# Patient Record
Sex: FEMALE | Race: WHITE
[De-identification: names, ages, dates, MRNs, and addresses within clinical notes are randomized per-mention and may not be internally consistent; named-entity substitution may affect disease eponyms.]

---

## 2018-11-12 ENCOUNTER — HOSPITAL ENCOUNTER (INPATIENT)
Dept: HOSPITAL 26 - MED | Age: 38
Discharge: LEFT BEFORE DISCHARGE | DRG: 284 | End: 2018-11-12
Attending: GENERAL PRACTICE | Admitting: GENERAL PRACTICE
Payer: MEDICAID

## 2018-11-12 VITALS — SYSTOLIC BLOOD PRESSURE: 107 MMHG | DIASTOLIC BLOOD PRESSURE: 68 MMHG

## 2018-11-12 VITALS — BODY MASS INDEX: 42.68 KG/M2 | HEIGHT: 64 IN | WEIGHT: 250 LBS

## 2018-11-12 VITALS — SYSTOLIC BLOOD PRESSURE: 131 MMHG | DIASTOLIC BLOOD PRESSURE: 78 MMHG

## 2018-11-12 VITALS — SYSTOLIC BLOOD PRESSURE: 104 MMHG | DIASTOLIC BLOOD PRESSURE: 66 MMHG

## 2018-11-12 DIAGNOSIS — K76.89: ICD-10-CM

## 2018-11-12 DIAGNOSIS — E66.01: ICD-10-CM

## 2018-11-12 DIAGNOSIS — E78.5: ICD-10-CM

## 2018-11-12 DIAGNOSIS — Z88.8: ICD-10-CM

## 2018-11-12 DIAGNOSIS — Z91.14: ICD-10-CM

## 2018-11-12 DIAGNOSIS — Z98.51: ICD-10-CM

## 2018-11-12 DIAGNOSIS — Z53.21: ICD-10-CM

## 2018-11-12 DIAGNOSIS — Z90.49: ICD-10-CM

## 2018-11-12 DIAGNOSIS — Z91.041: ICD-10-CM

## 2018-11-12 DIAGNOSIS — K83.8: Primary | ICD-10-CM

## 2018-11-12 LAB
ALBUMIN FLD-MCNC: 3.6 G/DL (ref 3.4–5)
AMYLASE SERPL-CCNC: 70 U/L (ref 25–115)
ANION GAP SERPL CALCULATED.3IONS-SCNC: 13.5 MMOL/L (ref 8–16)
AST SERPL-CCNC: 24 U/L (ref 15–37)
BASOPHILS # BLD AUTO: 0.1 K/UL (ref 0–0.22)
BASOPHILS NFR BLD AUTO: 1.2 % (ref 0–2)
BILIRUB SERPL-MCNC: 0.4 MG/DL (ref 0–1)
BUN SERPL-MCNC: 12 MG/DL (ref 7–18)
CHLORIDE SERPL-SCNC: 103 MMOL/L (ref 98–107)
CHOLEST/HDLC SERPL: 2.8 {RATIO} (ref 1–4.5)
CO2 SERPL-SCNC: 27.6 MMOL/L (ref 21–32)
CREAT SERPL-MCNC: 0.8 MG/DL (ref 0.6–1.3)
EOSINOPHIL # BLD AUTO: 0.1 K/UL (ref 0–0.4)
EOSINOPHIL NFR BLD AUTO: 1.3 % (ref 0–4)
ERYTHROCYTE [DISTWIDTH] IN BLOOD BY AUTOMATED COUNT: 14.5 % (ref 11.6–13.7)
GFR SERPL CREATININE-BSD FRML MDRD: 105 ML/MIN (ref 90–?)
GLUCOSE SERPL-MCNC: 101 MG/DL (ref 74–106)
HCT VFR BLD AUTO: 42.1 % (ref 36–48)
HDLC SERPL-MCNC: 72 MG/DL (ref 40–60)
HGB BLD-MCNC: 13.5 G/DL (ref 12–16)
LDLC SERPL CALC-MCNC: 114 MG/DL (ref 60–100)
LIPASE SERPL-CCNC: 378 U/L (ref 73–393)
LYMPHOCYTES # BLD AUTO: 2.4 K/UL (ref 2.5–16.5)
LYMPHOCYTES NFR BLD AUTO: 21.6 % (ref 20.5–51.1)
MAGNESIUM SERPL-MCNC: 1.9 MG/DL (ref 1.8–2.4)
MCH RBC QN AUTO: 28 PG (ref 27–31)
MCHC RBC AUTO-ENTMCNC: 32 G/DL (ref 33–37)
MCV RBC AUTO: 86.5 FL (ref 80–94)
MONOCYTES # BLD AUTO: 0.4 K/UL (ref 0.8–1)
MONOCYTES NFR BLD AUTO: 3.8 % (ref 1.7–9.3)
NEUTROPHILS # BLD AUTO: 7.8 K/UL (ref 1.8–7.7)
NEUTROPHILS NFR BLD AUTO: 72.1 % (ref 42.2–75.2)
PHOSPHATE SERPL-MCNC: 3.6 MG/DL (ref 2.5–4.9)
PLATELET # BLD AUTO: 371 K/UL (ref 140–450)
POTASSIUM SERPL-SCNC: 4.1 MMOL/L (ref 3.5–5.1)
PROTHROMBIN TIME: 9.4 SECS (ref 10.8–13.4)
RBC # BLD AUTO: 4.87 MIL/UL (ref 4.2–5.4)
SODIUM SERPL-SCNC: 140 MMOL/L (ref 136–145)
T4 FREE SERPL-MCNC: 0.82 NG/DL (ref 0.76–1.46)
TRIGL SERPL-MCNC: 67 MG/DL (ref 30–150)
TSH SERPL DL<=0.05 MIU/L-ACNC: 1.62 UIU/ML (ref 0.34–3.74)
WBC # BLD AUTO: 10.9 K/UL (ref 4.8–10.8)

## 2018-11-12 NOTE — NUR
AGO.PT C/O R ABD PAIN X1 WEEK, 7/10 SHARP PAIN. REPORTS N/V X 3 EPISODES TODAY. 
BM NORMAL THIS AM.MED HX: GALL BLADDER REMOVAL 10 YEARS . ALLERGIES---TORADOL, 
IV CONTRAST. SKIN IS PINK/WARM/DRY; AAOX4 WITH EVEN AND STEADY GAIT; PATIENT 
STATES PAIN OF 7/10 AT THIS TIME; PATIENT POSITIONED FOR COMFORT; HOB ELEVATED; 
BEDRAILS UP X2; BED DOWN. ER MD MADE AWARE OF PT STATUS.

## 2018-11-12 NOTE — NUR
SPOKE WITH DR. ROGERS REGARDING BENADRYL ORDER, DR. ROGERS D/C'D THE BENADRYL PO ORDER AND 
ORDERED 12.5MG OF BENADRYL IVP.

## 2018-11-12 NOTE — NUR
DR. ROGERS WAS IN PT ROOM EXPLAING THAT SHE ORDERED THE TRANSVAGINAL US TO RULE OUT PAIN IN 
LOWER ABD CAUSED BY A TWISTED OVARY. PT THEN AGREED TO TAKE THE TEST. PT STILL RESTLESS AND 
AFTER DR. ROGERS LEFT, PT WANTED TO KNOW WHAT SHE WOULD BE GIVEN IF PAIN RETURNED AGAIN AND 
PT WAS INFORMED THAT DR. ROGERS ORDERED NORCO 7.5/325MG AND PT WAS UPSET BECAUSE SHE SAID 
THAT SHE TAKES 10/325MG NORCO AT HOME AND SHE FELT THAT HER PAIN WOULD BE BETTER CONTROLLED 
AT HOME. PT SAID THAT SHE WAS GOING WITH HER MOTHER TO [facility] TOMMORROW. PT DIDNT WANT TO 
SPEAK WITH  BUT PRIMARY NURSE SAID THAT IF SHE WANTS TO LEAVE [place] THAT THERE IS A FORM TO 
SIGN AND THAT THE DOCTOR WOULD NEED TO SPEAK WITH YOU ONE LAST TIME., PT VERBALIZED 
UNDERSTANDING AND ASKED FOR THE IV FLUIDS TO BE STOPPED SO SHE COULD GET DRESSED IN HER 
STREET CLOTHES. PRIMARY NURSE STOPPED FLUIDS AND TOOK OUT IV SITE. DR. ROGERS INFORMED.

## 2018-11-12 NOTE — NUR
PT ARRIVED ON THE UNIT WITH 1 ER NURSE, IN A WHEELCHAIR. PT IS ALERT AND ORIENTED. 
INTRODUCED MYSELF AND UPDATED THE BOARD. PT AMBULATED FROM THE WHEELCHAIR TO THE BED AND TO 
THE BATHROOM AND BACK. STEADY GAIT. C/O ABD PAIN. WILL CHECK ORDERS. IV ON R FA 24G ZOSYN 
INFUSING. SKIN INTACT. VS WITHIN NORMAL LIMITS. MRSA SCREENING DONE. WILL CONTINUE TO 
MONITOR PT.

## 2018-11-12 NOTE — NUR
DR. ROGERS ARRIVED IN PT ROOM AND EXPLAINED THE AMA FORM AND THAT THE HOSPITAL IS NOT 
LIABLE FOR ANY NEGATIVE EFFECTS IF SHE CHOOSES TO LEAVE AMA. PT SIGNED PAPER AND VERBALIZED 
UNDERSTANDING. PT ARM BAND CUT OFF AND PT  LEFT IN STREET CLOTHES.

## 2018-11-12 NOTE — NUR
PT SITTING IN LOW BED HOB UP 45%. V/S AS FOLLOWS T 98.2 P 90 R 20 B/P 141/72 02 93% WITH 
R/A. PT AOX4 BUT APPEARED ANXIOUS AND ABOUT  NOT RECEIVING MORE PAIN MEDS FOR HER ABD. PT 
INFORMED THAT REPORT WAS GIVEN FROM RELIVING NURSE AND THAT PRIMAY NURSE WILL DOUBLE CHECK 
WHAT THE DOCTOR ORDERED AND WHAT WAS ALREADY GIVEN  FOR PAIN AT THIS TIME. PT VERBALIZED 
UNDERSTANDING.

## 2018-11-12 NOTE — NUR
PT C/O PAIN, 8/10 AND IT[address]INESS. DR. ROGERS ORDERED A GI COCKTAIL FOR ABD PAIN. PT TOOK 
MEDICATION AS ADMINISTERED BUT IS STILL HAS C/O OF SEVERE PAIN. DR. ROGERS ODORED DILAUDID 
0.5MG, WHICH WAS GIVEN IV PUSH. PT CONTINUED TO COMPLAIN OF ITCHINESS AND WAS ORDERED 25MG 
P.O. BENADRYL. PT ASKED IF ANYTHING FOR ITCHINESS WAS ORDERED, AND WHEN PT WAS INFORMED THAT 
MEDICATION WAS ORDERED P.O. PT BECAME UPSET AND WANTED BENADRYL IV FOR ITCHING ALL OVER. 
PRIMARY NURSE SAID THAT SHE WOULD SPEAK TO MD REGARDING THE ORDER, BECAUSE PT WAS NOTED 
SCRATCHING SELF ALL OVER.